# Patient Record
Sex: MALE | Race: WHITE | Employment: OTHER | URBAN - METROPOLITAN AREA
[De-identification: names, ages, dates, MRNs, and addresses within clinical notes are randomized per-mention and may not be internally consistent; named-entity substitution may affect disease eponyms.]

---

## 2018-09-11 ENCOUNTER — OFFICE VISIT (OUTPATIENT)
Dept: PODIATRY CLINIC | Facility: CLINIC | Age: 68
End: 2018-09-11
Payer: COMMERCIAL

## 2018-09-11 DIAGNOSIS — E10.40 CONTROLLED TYPE 1 DIABETES WITH NEUROPATHY (HCC): Primary | ICD-10-CM

## 2018-09-11 DIAGNOSIS — Z86.31 HISTORY OF DIABETIC ULCER OF FOOT: ICD-10-CM

## 2018-09-11 DIAGNOSIS — B35.1 ONYCHOMYCOSIS: ICD-10-CM

## 2018-09-11 DIAGNOSIS — E11.610 CHARCOT FOOT DUE TO DIABETES MELLITUS (HCC): ICD-10-CM

## 2018-09-11 PROCEDURE — 99213 OFFICE O/P EST LOW 20 MIN: CPT | Performed by: PODIATRIST

## 2018-09-13 NOTE — PROGRESS NOTES
Sary Harris is a 79year old male. Patient presents with:  Diabetic Foot Care: Pt is here for a diabetic foot assessment. Pt for nail care. Pt has neuropathy . Denies any wounds or ulcers presently.    Toenail Care        HPI:   This pleasant gentleman exertion  GI: denies abdominal pain and denies heartburn  NEURO: denies headaches    EXAM:   There were no vitals taken for this visit. Physical Exam  GENERAL: well developed, well nourished, in no apparent distress  EXTREMITIES:   1.  Integument: The skin

## 2021-11-02 ENCOUNTER — OFFICE VISIT (OUTPATIENT)
Dept: PODIATRY CLINIC | Facility: CLINIC | Age: 71
End: 2021-11-02
Payer: COMMERCIAL

## 2021-11-02 DIAGNOSIS — R20.8 LOSS OF PROTECTIVE SENSATION OF SKIN OF DEFORMED FOOT: ICD-10-CM

## 2021-11-02 DIAGNOSIS — B35.1 ONYCHOMYCOSIS: ICD-10-CM

## 2021-11-02 DIAGNOSIS — E11.42 DIABETIC PERIPHERAL NEUROPATHY (HCC): Primary | ICD-10-CM

## 2021-11-02 DIAGNOSIS — M21.969 LOSS OF PROTECTIVE SENSATION OF SKIN OF DEFORMED FOOT: ICD-10-CM

## 2021-11-02 DIAGNOSIS — M14.672 CHARCOT'S JOINT OF FOOT, LEFT: ICD-10-CM

## 2021-11-02 PROCEDURE — 99203 OFFICE O/P NEW LOW 30 MIN: CPT | Performed by: PODIATRIST

## 2021-11-02 PROCEDURE — 11721 DEBRIDE NAIL 6 OR MORE: CPT | Performed by: PODIATRIST

## 2021-11-03 NOTE — PROGRESS NOTES
Moisés Atkins is a 73year old male.  Patient presents with:  New Patient: foot check        HPI:   This pleasant gentleman returns to the clinic for routine diabetic foot care and evaluation he has a known history of diabetic peripheral neuropathy and Ch in both feet he cannot feel the [place] 10 g filament in any dermatomes and he does have foot deformity.    4. Musculoskeletal: The patient does have foot deformity he has a Charcot foot which is very mild on the left side and some mild hyperkerato

## 2022-07-04 ENCOUNTER — WALK IN (OUTPATIENT)
Dept: URGENT CARE | Age: 72
End: 2022-07-04

## 2022-07-04 VITALS
DIASTOLIC BLOOD PRESSURE: 64 MMHG | SYSTOLIC BLOOD PRESSURE: 108 MMHG | RESPIRATION RATE: 16 BRPM | TEMPERATURE: 98.4 F | OXYGEN SATURATION: 98 % | BODY MASS INDEX: 36.45 KG/M2 | WEIGHT: 315 LBS | HEART RATE: 84 BPM | HEIGHT: 78 IN

## 2022-07-04 DIAGNOSIS — L08.9 INFECTION OF TOE: Primary | ICD-10-CM

## 2022-07-04 PROCEDURE — 99202 OFFICE O/P NEW SF 15 MIN: CPT | Performed by: NURSE PRACTITIONER

## 2022-07-04 RX ORDER — ZOLPIDEM TARTRATE 10 MG/1
TABLET ORAL
COMMUNITY
Start: 2022-04-14

## 2022-07-04 RX ORDER — PROCHLORPERAZINE 25 MG/1
SUPPOSITORY RECTAL
COMMUNITY
Start: 2022-06-22

## 2022-07-04 RX ORDER — OLMESARTAN MEDOXOMIL AND HYDROCHLOROTHIAZIDE 40/12.5 40; 12.5 MG/1; MG/1
1 TABLET ORAL DAILY
COMMUNITY
Start: 2022-05-15

## 2022-07-04 RX ORDER — ATORVASTATIN CALCIUM 10 MG/1
10 TABLET, FILM COATED ORAL DAILY
COMMUNITY
Start: 2022-05-15

## 2022-07-04 RX ORDER — RIVAROXABAN 20 MG/1
20 TABLET, FILM COATED ORAL DAILY
COMMUNITY
Start: 2022-06-04

## 2022-07-04 RX ORDER — AMOXICILLIN AND CLAVULANATE POTASSIUM 875; 125 MG/1; MG/1
1 TABLET, FILM COATED ORAL EVERY 12 HOURS
Qty: 20 TABLET | Refills: 0 | Status: SHIPPED | OUTPATIENT
Start: 2022-07-04

## 2023-05-15 ENCOUNTER — TELEPHONE (OUTPATIENT)
Dept: PODIATRY CLINIC | Facility: CLINIC | Age: 73
End: 2023-05-15

## 2023-05-15 NOTE — TELEPHONE ENCOUNTER
Patients spouse requesting appointment today for patient. States his nail is about to fall off and has pus coming out with an odor. States patient is a diabetic. No appointments available. Last seen 11/2021.  Please advise

## 2023-05-15 NOTE — TELEPHONE ENCOUNTER
S/w patient- Patient states that he hit his right great toe on the wall. This happened last week per patient. States that nail is coming off if and it is oozing a pus that has a bad odor. Denies fevers or chills. Patient spoke with PCP and they prescribed him antibiotics which he hadn't started taking yet, because he is unsure if there is an infection. I advised patient to go to ED or UC to have toe assessed and to follow up with us after seeing ED or UC. Patient verbalized understanding.     TANK

## 2024-11-11 ENCOUNTER — OFFICE VISIT (OUTPATIENT)
Dept: PODIATRY CLINIC | Facility: CLINIC | Age: 74
End: 2024-11-11

## 2024-11-11 DIAGNOSIS — B35.1 ONYCHOMYCOSIS: Primary | ICD-10-CM

## 2024-11-11 DIAGNOSIS — E11.42 DIABETIC PERIPHERAL NEUROPATHY (HCC): ICD-10-CM

## 2024-11-11 DIAGNOSIS — I73.89 OTHER SPECIFIED PERIPHERAL VASCULAR DISEASES (HCC): ICD-10-CM

## 2024-11-11 RX ORDER — TIRZEPATIDE 2.5 MG/.5ML
2.5 INJECTION, SOLUTION SUBCUTANEOUS ONCE
COMMUNITY

## 2024-11-11 RX ORDER — ISOSORBIDE DINITRATE 5 MG/1
5 TABLET ORAL
COMMUNITY

## 2024-11-11 RX ORDER — INSULIN GLARGINE 100 [IU]/ML
96 INJECTION, SOLUTION SUBCUTANEOUS NIGHTLY
COMMUNITY

## 2024-11-11 RX ORDER — OLMESARTAN MEDOXOMIL 20 MG/1
20 TABLET ORAL DAILY
COMMUNITY

## 2024-11-11 RX ORDER — ROSUVASTATIN CALCIUM 10 MG/1
10 TABLET, COATED ORAL NIGHTLY
COMMUNITY

## 2024-11-11 NOTE — PROGRESS NOTES
Hector Mary is a 74 year old male.   Chief Complaint   Patient presents with    Diabetic Foot Care     Nail care and foot check- fbg  158- A1c was not done- ldv 09/24- handicap paperwork          HPI:   This patient who has been absent for some time presents to the clinic complaining that he has a left great toenail that seems to be coming off this happened sometime ago he is not getting any drainage out of it there is no erythema no edema no sign of infection but the nail seems loose and.  At today's visit reviewed nurse's history as taken above, allergies medications and medical history as documented below.  All changes duly noted  Allergies: Patient has no known allergies.   Current Outpatient Medications   Medication Sig Dispense Refill    rivaroxaban (XARELTO) 20 MG Oral Tab Take 1 tablet (20 mg total) by mouth.      olmesartan 20 MG Oral Tab Take 1 tablet (20 mg total) by mouth daily.      rosuvastatin 10 MG Oral Tab Take 1 tablet (10 mg total) by mouth nightly.      isosorbide dinitrate 5 MG Oral Tab Take 1 tablet (5 mg total) by mouth TID (Nitrates).      insulin glargine 100 UNIT/ML Subcutaneous Solution Inject 9,600 Units into the skin nightly.      Insulin Lispro (HUMALOG SC) Inject 15 mL into the skin in the morning, at noon, and at bedtime.      Tirzepatide (MOUNJARO) 2.5 MG/0.5ML Subcutaneous Solution Auto-injector Inject 2.5 mL into the skin once.      metoprolol tartrate 10 mg/mL Oral Suspension Take 1 mL (10 mg total) by mouth 2x Daily(Beta Blocker).        Past Medical History:    Diabetes (HCC)    Essential hypertension    Hyperlipidemia      Past Surgical History:   Procedure Laterality Date    Lap adjustable gastric band      2002      History reviewed. No pertinent family history.   Social History     Socioeconomic History    Marital status:    Tobacco Use    Smoking status: Former    Smokeless tobacco: Never   Substance and Sexual Activity    Alcohol use: Yes    Drug use: No            REVIEW OF SYSTEMS:   Today reviewed systens as documented below  GENERAL HEALTH: feels well otherwise  SKIN: Refer to exam below  RESPIRATORY: denies shortness of breath with exertion  CARDIOVASCULAR: denies chest pain on exertion  GI: denies abdominal pain and denies heartburn  NEURO: denies headaches    EXAM:   There were no vitals taken for this visit.  GENERAL: well developed, well nourished, in no apparent distress  EXTREMITIES:   1. Integument: The skin is feet is warm and dry.  His nails 1-5 are all thickened yellowed and dystrophic left hallux nail is onycholysis from the bed but there is no purulence no sign of infection.   2. Vascular: Patient has weakened circulation capillary turn is 3 seconds bilateral feet has a weakened dorsalis pedis I cannot feel a posterior tibial which really makes no history of cramping when walking no history of claudication   3. Neurologic: Has loss of protective sensation diabetic neuropathy is present   4. Musculoskeletal: Patient has good muscle strength he has arch collapse bilateral feet.    ASSESSMENT AND PLAN:   Diagnoses and all orders for this visit:    Onychomycosis    Diabetic peripheral neuropathy (HCC)    Other specified peripheral vascular diseases (HCC)        Plan: Today using a nail nippers were trimmed and debrided toenails 1-5 manually and mechanically in girth and width as far down to healthy tissue as possible on both feet.  This was done uneventfully there was no hemorrhage.  There is mild incurvation of the medial and lateral nail borders of the hallux which using a slant back technique were removed and they would not get ingrown.   At today's visit I reminded the patient about daily foot checks  Reminded patient again of proper control of his diabetes to help prevent any severe complications in his feet  Proper foot hygiene moisturizing except between the toes was reviewed  Advised follow-up again every 2 to 3 months for routine care but emphasized  to him the importance of coming in sooner if he notices any problems.  I discussed with the patient the symptoms of claudication he needs to seek medical management for that right away should happen.  He understood.    The patient indicates understanding of these issues and agrees to the plan.    Isaias Lamb, AGUSTIN